# Patient Record
Sex: FEMALE | Race: BLACK OR AFRICAN AMERICAN | Employment: PART TIME | URBAN - METROPOLITAN AREA
[De-identification: names, ages, dates, MRNs, and addresses within clinical notes are randomized per-mention and may not be internally consistent; named-entity substitution may affect disease eponyms.]

---

## 2021-06-03 ENCOUNTER — LABORATORY ENCOUNTER (OUTPATIENT)
Dept: LAB | Facility: REFERENCE LAB | Age: 56
End: 2021-06-03
Attending: FAMILY MEDICINE
Payer: COMMERCIAL

## 2021-06-03 ENCOUNTER — OFFICE VISIT (OUTPATIENT)
Dept: FAMILY MEDICINE CLINIC | Facility: CLINIC | Age: 56
End: 2021-06-03
Payer: COMMERCIAL

## 2021-06-03 VITALS
WEIGHT: 169 LBS | SYSTOLIC BLOOD PRESSURE: 130 MMHG | DIASTOLIC BLOOD PRESSURE: 82 MMHG | OXYGEN SATURATION: 98 % | HEART RATE: 80 BPM | BODY MASS INDEX: 29.95 KG/M2 | HEIGHT: 63 IN

## 2021-06-03 DIAGNOSIS — Z13.220 SCREENING FOR HYPERLIPIDEMIA: ICD-10-CM

## 2021-06-03 DIAGNOSIS — Z87.898 HISTORY OF SEIZURE: ICD-10-CM

## 2021-06-03 DIAGNOSIS — Z00.00 PHYSICAL EXAM, ANNUAL: Primary | ICD-10-CM

## 2021-06-03 DIAGNOSIS — Z13.1 DIABETES MELLITUS SCREENING: ICD-10-CM

## 2021-06-03 DIAGNOSIS — R42 DIZZINESS: ICD-10-CM

## 2021-06-03 DIAGNOSIS — K21.9 GASTROESOPHAGEAL REFLUX DISEASE, UNSPECIFIED WHETHER ESOPHAGITIS PRESENT: ICD-10-CM

## 2021-06-03 DIAGNOSIS — Z12.31 ENCOUNTER FOR SCREENING MAMMOGRAM FOR MALIGNANT NEOPLASM OF BREAST: ICD-10-CM

## 2021-06-03 PROCEDURE — 3075F SYST BP GE 130 - 139MM HG: CPT | Performed by: FAMILY MEDICINE

## 2021-06-03 PROCEDURE — 3079F DIAST BP 80-89 MM HG: CPT | Performed by: FAMILY MEDICINE

## 2021-06-03 PROCEDURE — 99386 PREV VISIT NEW AGE 40-64: CPT | Performed by: FAMILY MEDICINE

## 2021-06-03 PROCEDURE — 99214 OFFICE O/P EST MOD 30 MIN: CPT | Performed by: FAMILY MEDICINE

## 2021-06-03 PROCEDURE — 3008F BODY MASS INDEX DOCD: CPT | Performed by: FAMILY MEDICINE

## 2021-06-03 RX ORDER — OMEPRAZOLE 20 MG/1
20 TABLET, DELAYED RELEASE ORAL DAILY
Qty: 90 TABLET | Refills: 1 | Status: SHIPPED | OUTPATIENT
Start: 2021-06-03

## 2021-06-03 NOTE — PROGRESS NOTES
HPI:   Krys De La Cruz is a 61year old female who presents for a complete physical exam, ED f/u, and refill of omeprazole,     ED f/u: Went to [facility] ED on 5/30 for acute dizziness and nausea.  Had labs and EKG that were normal. Was discharged home with z ANESTH, SECTION      x2   • HYSTERECTOMY  2015    Partial  AUB.       Family History   Problem Relation Age of Onset   • Hypertension Mother    • Heart Disease Mother    • Other (COPD) Maternal Grandfather    • Other (Stomach Cancer) Sister send results from prior EGD. -History of seizures: Last seizure was >20 years ago. Plan to re-establish with neurology if recurs. -Due for mammo.  -Baseline labs ordered.   -Pt to send results from prior colonoscopy. -Vaccines UTD.      Discussed with

## 2021-06-08 ENCOUNTER — TELEPHONE (OUTPATIENT)
Dept: FAMILY MEDICINE CLINIC | Facility: CLINIC | Age: 56
End: 2021-06-08

## 2021-06-08 NOTE — TELEPHONE ENCOUNTER
Francis Ray, DO   6/7/2021 12:12 PM CDT Back to Top      Called and LVMTCB. Francis Ray,    6/3/2021  5:35 PM CDT       Called and LVM. 15

## 2021-06-18 ENCOUNTER — HOSPITAL ENCOUNTER (OUTPATIENT)
Dept: MAMMOGRAPHY | Age: 56
Discharge: HOME | End: 2021-06-18
Attending: FAMILY MEDICINE
Payer: COMMERCIAL

## 2021-06-18 DIAGNOSIS — Z12.31 ENCOUNTER FOR SCREENING MAMMOGRAM FOR MALIGNANT NEOPLASM OF BREAST: ICD-10-CM

## 2021-06-18 PROCEDURE — 77063 BREAST TOMOSYNTHESIS BI: CPT | Performed by: FAMILY MEDICINE

## 2021-06-18 PROCEDURE — 77067 SCR MAMMO BI INCL CAD: CPT | Performed by: FAMILY MEDICINE

## 2021-06-23 ENCOUNTER — MED REC SCAN ONLY (OUTPATIENT)
Dept: FAMILY MEDICINE CLINIC | Facility: CLINIC | Age: 56
End: 2021-06-23

## 2021-07-20 ENCOUNTER — MED REC SCAN ONLY (OUTPATIENT)
Dept: FAMILY MEDICINE CLINIC | Facility: CLINIC | Age: 56
End: 2021-07-20

## 2021-07-28 ENCOUNTER — HOSPITAL ENCOUNTER (OUTPATIENT)
Dept: MAMMOGRAPHY | Facility: HOSPITAL | Age: 56
Discharge: HOME | End: 2021-07-28
Attending: FAMILY MEDICINE
Payer: COMMERCIAL

## 2021-07-28 ENCOUNTER — HOSPITAL ENCOUNTER (OUTPATIENT)
Dept: ULTRASOUND IMAGING | Facility: HOSPITAL | Age: 56
Discharge: HOME | End: 2021-07-28
Attending: FAMILY MEDICINE
Payer: COMMERCIAL

## 2021-07-28 DIAGNOSIS — R92.8 ABNORMAL MAMMOGRAM: ICD-10-CM

## 2021-07-28 PROCEDURE — 76642 ULTRASOUND BREAST LIMITED: CPT | Performed by: FAMILY MEDICINE

## 2021-07-28 PROCEDURE — 77065 DX MAMMO INCL CAD UNI: CPT | Performed by: FAMILY MEDICINE

## 2021-07-28 PROCEDURE — 77061 BREAST TOMOSYNTHESIS UNI: CPT | Performed by: FAMILY MEDICINE

## 2021-08-12 ENCOUNTER — TELEPHONE (OUTPATIENT)
Dept: FAMILY MEDICINE CLINIC | Facility: CLINIC | Age: 56
End: 2021-08-12

## 2021-08-12 ENCOUNTER — HOSPITAL ENCOUNTER (OUTPATIENT)
Age: 56
Discharge: HOME | End: 2021-08-12
Payer: COMMERCIAL

## 2021-08-12 VITALS
SYSTOLIC BLOOD PRESSURE: 139 MMHG | OXYGEN SATURATION: 100 % | DIASTOLIC BLOOD PRESSURE: 91 MMHG | HEART RATE: 76 BPM | HEIGHT: 63 IN | RESPIRATION RATE: 18 BRPM | TEMPERATURE: 97 F | BODY MASS INDEX: 29.95 KG/M2 | WEIGHT: 169 LBS

## 2021-08-12 DIAGNOSIS — R42 VERTIGO: Primary | ICD-10-CM

## 2021-08-12 PROCEDURE — 93000 ELECTROCARDIOGRAM COMPLETE: CPT | Performed by: NURSE PRACTITIONER

## 2021-08-12 PROCEDURE — 96360 HYDRATION IV INFUSION INIT: CPT | Performed by: NURSE PRACTITIONER

## 2021-08-12 PROCEDURE — 99204 OFFICE O/P NEW MOD 45 MIN: CPT | Performed by: NURSE PRACTITIONER

## 2021-08-12 RX ORDER — SODIUM CHLORIDE 9 MG/ML
1000 INJECTION, SOLUTION INTRAVENOUS ONCE
Status: COMPLETED | OUTPATIENT
Start: 2021-08-12 | End: 2021-08-12

## 2021-08-12 RX ORDER — DIAZEPAM 5 MG/1
5 TABLET ORAL 3 TIMES DAILY PRN
Qty: 20 TABLET | Refills: 0 | Status: SHIPPED | OUTPATIENT
Start: 2021-08-12 | End: 2021-08-19

## 2021-08-12 RX ORDER — DIAZEPAM 5 MG/1
5 TABLET ORAL ONCE
Status: COMPLETED | OUTPATIENT
Start: 2021-08-12 | End: 2021-08-12

## 2021-08-12 NOTE — TELEPHONE ENCOUNTER
Patient calling symptoms are : dizziness for about 3 hours. I took ondansedron for dizzness but it has not helped.

## 2021-08-12 NOTE — ED INITIAL ASSESSMENT (HPI)
Per pt having dizziness feeling unsteady and nausea since this morning, worse with movement or opening eyes. Per pt took zofran pta. States was recently seen at [facility] for the same symptoms.

## 2021-08-12 NOTE — TELEPHONE ENCOUNTER
Called pt and c/o dizziness for 3 hours, \"when standing-up room is spinning\". C/o little blurry vision. Pt took zofran therefore denies N/V. Denies headache,       Advised pt to go to IC. Pt verbalized understanding and agrees with POC.           Sallye Nageotte

## 2021-08-12 NOTE — ED PROVIDER NOTES
Patient Seen in: [facility]      History   Patient presents with:  Dizziness    Stated Complaint: Dizzy    HPI/Subjective:   HPI    This is a 21-year-old female with a history of seizures who presents with a chief complaint of dizziness.   Pa °C)   Temp src 08/12/21 1233 Temporal   SpO2 08/12/21 1233 100 %   O2 Device 08/12/21 1233 None (Room air)       Current:BP (!) 139/91   Pulse 76   Temp 96.7 °F (35.9 °C) (Temporal)   Resp 18   Ht 160 cm (5' 3\")   Wt 76.7 kg   SpO2 100%   BMI 29.94 kg/m² Course   Labs Reviewed - No data to display  EKG    Rate, intervals and axes as noted on EKG Report. Rate: 75  Rhythm: Sinus Rhythm  Reading: Sinus Rhythm           IV fluids, EKG, Valium and re-evaluate. EKG reviewed and was unremarkable.   IV fluids c Normal, Disp-20 tablet, R-0

## 2021-12-02 ENCOUNTER — HOSPITAL ENCOUNTER (OUTPATIENT)
Age: 56
Discharge: ANOTHER HOSPITAL | End: 2021-12-02
Payer: COMMERCIAL

## 2021-12-02 ENCOUNTER — APPOINTMENT (OUTPATIENT)
Dept: CT IMAGING | Facility: HOSPITAL | Age: 56
End: 2021-12-02
Attending: EMERGENCY MEDICINE
Payer: COMMERCIAL

## 2021-12-02 ENCOUNTER — HOSPITAL ENCOUNTER (EMERGENCY)
Facility: HOSPITAL | Age: 56
Discharge: HOME | End: 2021-12-02
Attending: EMERGENCY MEDICINE
Payer: COMMERCIAL

## 2021-12-02 VITALS
DIASTOLIC BLOOD PRESSURE: 77 MMHG | SYSTOLIC BLOOD PRESSURE: 114 MMHG | RESPIRATION RATE: 16 BRPM | OXYGEN SATURATION: 99 % | TEMPERATURE: 97 F | HEART RATE: 73 BPM

## 2021-12-02 VITALS
OXYGEN SATURATION: 99 % | HEART RATE: 77 BPM | DIASTOLIC BLOOD PRESSURE: 83 MMHG | SYSTOLIC BLOOD PRESSURE: 121 MMHG | TEMPERATURE: 98 F | RESPIRATION RATE: 19 BRPM

## 2021-12-02 DIAGNOSIS — R10.9 ABDOMINAL PAIN, ACUTE: Primary | ICD-10-CM

## 2021-12-02 PROCEDURE — 74177 CT ABD & PELVIS W/CONTRAST: CPT | Performed by: EMERGENCY MEDICINE

## 2021-12-02 PROCEDURE — 85025 COMPLETE CBC W/AUTO DIFF WBC: CPT | Performed by: EMERGENCY MEDICINE

## 2021-12-02 PROCEDURE — 99215 OFFICE O/P EST HI 40 MIN: CPT | Performed by: NURSE PRACTITIONER

## 2021-12-02 PROCEDURE — 80048 BASIC METABOLIC PNL TOTAL CA: CPT | Performed by: EMERGENCY MEDICINE

## 2021-12-02 PROCEDURE — 99284 EMERGENCY DEPT VISIT MOD MDM: CPT

## 2021-12-02 PROCEDURE — 36415 COLL VENOUS BLD VENIPUNCTURE: CPT

## 2021-12-02 PROCEDURE — 81003 URINALYSIS AUTO W/O SCOPE: CPT

## 2021-12-02 PROCEDURE — 81003 URINALYSIS AUTO W/O SCOPE: CPT | Performed by: EMERGENCY MEDICINE

## 2021-12-02 NOTE — ED INITIAL ASSESSMENT (HPI)
Libra is here for evaluation of left lower abdominal pain since this morning. History of diverticulitis.

## 2021-12-02 NOTE — ED PROVIDER NOTES
Patient Seen in: [facility]mediate [facility]      History   Patient presents with:  Nausea/Vomiting/Diarrhea    Stated Complaint: abdominal pain    Subjective:   68-year-old female presents to immediate care today with nausea vomiting diarrhea and left lower note reviewed. Constitutional:       General: She is in acute distress (2/2 to pain). Appearance: Normal appearance. She is well-developed and normal weight. She is not ill-appearing or toxic-appearing.    HENT:      Head: Normocephalic and atraumati Patient declines citing that she wants to go to [facility].                         Disposition and Plan     Clinical Impression:  Abdominal pain, acute  (primary encounter diagnosis)     Disposition:   Ic to ed  12/2/2021  2:19 pm    [employer]

## 2021-12-02 NOTE — ED INITIAL ASSESSMENT (HPI)
Pt states woke up this morning nauseated and needing to vomit and have a bm. Pt states took a zofran and has not had vomiting abut has had diarrhea. Pt states lower abd discomfort.

## 2021-12-02 NOTE — ED PROVIDER NOTES
Patient Seen in: [facility] AND [facility] Emergency Department      History   Patient presents with:  Abdomen/Flank Pain    Stated Complaint:     Subjective:   HPI    59yoF with hx of diverticulitis here with c/o abdominal pain x1 day.  No aggravating or relievi Eyes:      Extraocular Movements: Extraocular movements intact. Cardiovascular:      Rate and Rhythm: Regular rhythm. Pulmonary:      Effort: Pulmonary effort is normal.   Abdominal:      General: There is no distension.    Musculoskeletal:         Ge x10(6)uL    HGB 14.4 12.0 - 16.0 g/dL    HCT 45.2 35.0 - 48.0 %    MCV 93.0 80.0 - 100.0 fL    MCH 29.6 26.0 - 34.0 pg    MCHC 31.9 31.0 - 37.0 g/dL    RDW-SD 46.3 35.1 - 46.3 fL    RDW 13.5 11.0 - 15.0 %    .0 150.0 - 450.0 10(3)uL    Neutrophil Ab pain  - I personally reviewed and interpreted all the ED vitals  - afebrile, hemodynamically stable  - I ordered and personally reviewed the labs and imaging and found CT without acute finding, no leukocytosis, no anemia, electrolytes reassuring, no bacter

## 2021-12-03 NOTE — ED QUICK NOTES
Patient discharged home in no acute distress. A&Ox4, skin p/w/d, denies cp/sob.  Ambulating with steady gait and verbalized understanding of d/c instructions

## 2023-03-29 ENCOUNTER — LAB ENCOUNTER (OUTPATIENT)
Dept: LAB | Facility: REFERENCE LAB | Age: 58
End: 2023-03-29
Attending: FAMILY MEDICINE
Payer: COMMERCIAL

## 2023-03-29 ENCOUNTER — OFFICE VISIT (OUTPATIENT)
Facility: CLINIC | Age: 58
End: 2023-03-29

## 2023-03-29 VITALS
DIASTOLIC BLOOD PRESSURE: 84 MMHG | HEIGHT: 63 IN | WEIGHT: 178 LBS | OXYGEN SATURATION: 97 % | HEART RATE: 88 BPM | BODY MASS INDEX: 31.54 KG/M2 | SYSTOLIC BLOOD PRESSURE: 130 MMHG

## 2023-03-29 DIAGNOSIS — M54.9 UPPER BACK PAIN ON RIGHT SIDE: ICD-10-CM

## 2023-03-29 DIAGNOSIS — Z00.00 PHYSICAL EXAM, ANNUAL: Primary | ICD-10-CM

## 2023-03-29 DIAGNOSIS — Z12.4 CERVICAL CANCER SCREENING: ICD-10-CM

## 2023-03-29 DIAGNOSIS — E78.5 DYSLIPIDEMIA: ICD-10-CM

## 2023-03-29 DIAGNOSIS — R73.09 ELEVATED HEMOGLOBIN A1C: ICD-10-CM

## 2023-03-29 DIAGNOSIS — R92.8 ABNORMAL MAMMOGRAM: ICD-10-CM

## 2023-03-29 DIAGNOSIS — K21.9 GASTROESOPHAGEAL REFLUX DISEASE, UNSPECIFIED WHETHER ESOPHAGITIS PRESENT: ICD-10-CM

## 2023-03-29 LAB
ALBUMIN SERPL-MCNC: 3.7 G/DL (ref 3.4–5)
ALBUMIN/GLOB SERPL: 1.1 {RATIO} (ref 1–2)
ALP LIVER SERPL-CCNC: 71 U/L
ALT SERPL-CCNC: 28 U/L
ANION GAP SERPL CALC-SCNC: 7 MMOL/L (ref 0–18)
AST SERPL-CCNC: 17 U/L (ref 15–37)
BASOPHILS # BLD AUTO: 0.02 X10(3) UL (ref 0–0.2)
BASOPHILS NFR BLD AUTO: 0.5 %
BILIRUB SERPL-MCNC: 0.7 MG/DL (ref 0.1–2)
BUN BLD-MCNC: 9 MG/DL (ref 7–18)
BUN/CREAT SERPL: 12 (ref 10–20)
CALCIUM BLD-MCNC: 9.2 MG/DL (ref 8.5–10.1)
CHLORIDE SERPL-SCNC: 112 MMOL/L (ref 98–112)
CHOLEST SERPL-MCNC: 198 MG/DL (ref ?–200)
CO2 SERPL-SCNC: 25 MMOL/L (ref 21–32)
CREAT BLD-MCNC: 0.75 MG/DL
DEPRECATED RDW RBC AUTO: 46.5 FL (ref 35.1–46.3)
EOSINOPHIL # BLD AUTO: 0.06 X10(3) UL (ref 0–0.7)
EOSINOPHIL NFR BLD AUTO: 1.6 %
ERYTHROCYTE [DISTWIDTH] IN BLOOD BY AUTOMATED COUNT: 13.7 % (ref 11–15)
EST. AVERAGE GLUCOSE BLD GHB EST-MCNC: 120 MG/DL (ref 68–126)
FASTING PATIENT LIPID ANSWER: NO
FASTING STATUS PATIENT QL REPORTED: NO
GFR SERPLBLD BASED ON 1.73 SQ M-ARVRAT: 92 ML/MIN/1.73M2 (ref 60–?)
GLOBULIN PLAS-MCNC: 3.4 G/DL (ref 2.8–4.4)
GLUCOSE BLD-MCNC: 109 MG/DL (ref 70–99)
HBA1C MFR BLD: 5.8 % (ref ?–5.7)
HCT VFR BLD AUTO: 44.7 %
HCV AB SERPL QL IA: NONREACTIVE
HDLC SERPL-MCNC: 62 MG/DL (ref 40–59)
HGB BLD-MCNC: 14.7 G/DL
IMM GRANULOCYTES # BLD AUTO: 0.01 X10(3) UL (ref 0–1)
IMM GRANULOCYTES NFR BLD: 0.3 %
LDLC SERPL CALC-MCNC: 120 MG/DL (ref ?–100)
LYMPHOCYTES # BLD AUTO: 1.22 X10(3) UL (ref 1–4)
LYMPHOCYTES NFR BLD AUTO: 32.8 %
MCH RBC QN AUTO: 30.3 PG (ref 26–34)
MCHC RBC AUTO-ENTMCNC: 32.9 G/DL (ref 31–37)
MCV RBC AUTO: 92.2 FL
MONOCYTES # BLD AUTO: 0.24 X10(3) UL (ref 0.1–1)
MONOCYTES NFR BLD AUTO: 6.5 %
NEUTROPHILS # BLD AUTO: 2.17 X10 (3) UL (ref 1.5–7.7)
NEUTROPHILS # BLD AUTO: 2.17 X10(3) UL (ref 1.5–7.7)
NEUTROPHILS NFR BLD AUTO: 58.3 %
NONHDLC SERPL-MCNC: 136 MG/DL (ref ?–130)
OSMOLALITY SERPL CALC.SUM OF ELEC: 297 MOSM/KG (ref 275–295)
PLATELET # BLD AUTO: 280 10(3)UL (ref 150–450)
POTASSIUM SERPL-SCNC: 3.7 MMOL/L (ref 3.5–5.1)
PROT SERPL-MCNC: 7.1 G/DL (ref 6.4–8.2)
RBC # BLD AUTO: 4.85 X10(6)UL
SODIUM SERPL-SCNC: 144 MMOL/L (ref 136–145)
TRIGL SERPL-MCNC: 91 MG/DL (ref 30–149)
VLDLC SERPL CALC-MCNC: 16 MG/DL (ref 0–30)
WBC # BLD AUTO: 3.7 X10(3) UL (ref 4–11)

## 2023-03-29 PROCEDURE — 85025 COMPLETE CBC W/AUTO DIFF WBC: CPT | Performed by: FAMILY MEDICINE

## 2023-03-29 PROCEDURE — 80061 LIPID PANEL: CPT | Performed by: FAMILY MEDICINE

## 2023-03-29 PROCEDURE — 80053 COMPREHEN METABOLIC PANEL: CPT | Performed by: FAMILY MEDICINE

## 2023-03-29 PROCEDURE — 83036 HEMOGLOBIN GLYCOSYLATED A1C: CPT | Performed by: FAMILY MEDICINE

## 2023-03-29 PROCEDURE — 87624 HPV HI-RISK TYP POOLED RSLT: CPT | Performed by: FAMILY MEDICINE

## 2023-03-29 PROCEDURE — 36415 COLL VENOUS BLD VENIPUNCTURE: CPT | Performed by: FAMILY MEDICINE

## 2023-03-29 PROCEDURE — 86803 HEPATITIS C AB TEST: CPT | Performed by: FAMILY MEDICINE

## 2023-03-29 PROCEDURE — 99396 PREV VISIT EST AGE 40-64: CPT | Performed by: FAMILY MEDICINE

## 2023-03-29 RX ORDER — OMEPRAZOLE 20 MG/1
20 TABLET, DELAYED RELEASE ORAL DAILY
Qty: 90 TABLET | Refills: 1 | Status: SHIPPED | OUTPATIENT
Start: 2023-03-29

## 2023-03-30 DIAGNOSIS — D72.9 ABNORMAL WBC COUNT: Primary | ICD-10-CM

## 2023-03-30 LAB — HPV I/H RISK 1 DNA SPEC QL NAA+PROBE: NEGATIVE

## 2024-04-02 ENCOUNTER — OFFICE VISIT (OUTPATIENT)
Facility: CLINIC | Age: 59
End: 2024-04-02
Payer: COMMERCIAL

## 2024-04-02 ENCOUNTER — LAB ENCOUNTER (OUTPATIENT)
Dept: LAB | Facility: REFERENCE LAB | Age: 59
End: 2024-04-02
Attending: FAMILY MEDICINE
Payer: COMMERCIAL

## 2024-04-02 VITALS
HEART RATE: 77 BPM | BODY MASS INDEX: 30.48 KG/M2 | HEIGHT: 63 IN | SYSTOLIC BLOOD PRESSURE: 134 MMHG | DIASTOLIC BLOOD PRESSURE: 80 MMHG | WEIGHT: 172 LBS | OXYGEN SATURATION: 98 %

## 2024-04-02 DIAGNOSIS — Z00.00 PHYSICAL EXAM, ANNUAL: Primary | ICD-10-CM

## 2024-04-02 DIAGNOSIS — K21.9 GASTROESOPHAGEAL REFLUX DISEASE, UNSPECIFIED WHETHER ESOPHAGITIS PRESENT: ICD-10-CM

## 2024-04-02 DIAGNOSIS — M25.512 CHRONIC LEFT SHOULDER PAIN: ICD-10-CM

## 2024-04-02 DIAGNOSIS — R73.09 ELEVATED HEMOGLOBIN A1C: ICD-10-CM

## 2024-04-02 DIAGNOSIS — G89.29 CHRONIC LEFT SHOULDER PAIN: ICD-10-CM

## 2024-04-02 DIAGNOSIS — E78.5 DYSLIPIDEMIA: ICD-10-CM

## 2024-04-02 DIAGNOSIS — R92.8 ABNORMAL MAMMOGRAM: ICD-10-CM

## 2024-04-02 LAB
ALBUMIN SERPL-MCNC: 4.4 G/DL (ref 3.2–4.8)
ALBUMIN/GLOB SERPL: 1.5 {RATIO} (ref 1–2)
ALP LIVER SERPL-CCNC: 67 U/L
ALT SERPL-CCNC: 22 U/L
ANION GAP SERPL CALC-SCNC: 2 MMOL/L (ref 0–18)
AST SERPL-CCNC: 20 U/L (ref ?–34)
BASOPHILS # BLD AUTO: 0.01 X10(3) UL (ref 0–0.2)
BASOPHILS NFR BLD AUTO: 0.3 %
BILIRUB SERPL-MCNC: 1 MG/DL (ref 0.2–1.1)
BUN BLD-MCNC: 10 MG/DL (ref 9–23)
BUN/CREAT SERPL: 13.3 (ref 10–20)
CALCIUM BLD-MCNC: 9.5 MG/DL (ref 8.7–10.4)
CHLORIDE SERPL-SCNC: 114 MMOL/L (ref 98–112)
CHOLEST SERPL-MCNC: 216 MG/DL (ref ?–200)
CO2 SERPL-SCNC: 24 MMOL/L (ref 21–32)
CREAT BLD-MCNC: 0.75 MG/DL
DEPRECATED RDW RBC AUTO: 45.1 FL (ref 35.1–46.3)
EGFRCR SERPLBLD CKD-EPI 2021: 91 ML/MIN/1.73M2 (ref 60–?)
EOSINOPHIL # BLD AUTO: 0.11 X10(3) UL (ref 0–0.7)
EOSINOPHIL NFR BLD AUTO: 2.9 %
ERYTHROCYTE [DISTWIDTH] IN BLOOD BY AUTOMATED COUNT: 13.8 % (ref 11–15)
EST. AVERAGE GLUCOSE BLD GHB EST-MCNC: 120 MG/DL (ref 68–126)
FASTING PATIENT LIPID ANSWER: YES
FASTING STATUS PATIENT QL REPORTED: YES
GLOBULIN PLAS-MCNC: 2.9 G/DL (ref 2.8–4.4)
GLUCOSE BLD-MCNC: 93 MG/DL (ref 70–99)
HBA1C MFR BLD: 5.8 % (ref ?–5.7)
HCT VFR BLD AUTO: 43.5 %
HDLC SERPL-MCNC: 52 MG/DL (ref 40–59)
HGB BLD-MCNC: 14.3 G/DL
IMM GRANULOCYTES # BLD AUTO: 0.01 X10(3) UL (ref 0–1)
IMM GRANULOCYTES NFR BLD: 0.3 %
LDLC SERPL CALC-MCNC: 150 MG/DL (ref ?–100)
LYMPHOCYTES # BLD AUTO: 1.42 X10(3) UL (ref 1–4)
LYMPHOCYTES NFR BLD AUTO: 36.9 %
MCH RBC QN AUTO: 29.4 PG (ref 26–34)
MCHC RBC AUTO-ENTMCNC: 32.9 G/DL (ref 31–37)
MCV RBC AUTO: 89.5 FL
MONOCYTES # BLD AUTO: 0.23 X10(3) UL (ref 0.1–1)
MONOCYTES NFR BLD AUTO: 6 %
NEUTROPHILS # BLD AUTO: 2.07 X10 (3) UL (ref 1.5–7.7)
NEUTROPHILS # BLD AUTO: 2.07 X10(3) UL (ref 1.5–7.7)
NEUTROPHILS NFR BLD AUTO: 53.6 %
NONHDLC SERPL-MCNC: 164 MG/DL (ref ?–130)
OSMOLALITY SERPL CALC.SUM OF ELEC: 289 MOSM/KG (ref 275–295)
PLATELET # BLD AUTO: 287 10(3)UL (ref 150–450)
POTASSIUM SERPL-SCNC: 3.8 MMOL/L (ref 3.5–5.1)
PROT SERPL-MCNC: 7.3 G/DL (ref 5.7–8.2)
RBC # BLD AUTO: 4.86 X10(6)UL
SODIUM SERPL-SCNC: 140 MMOL/L (ref 136–145)
TRIGL SERPL-MCNC: 80 MG/DL (ref 30–149)
VLDLC SERPL CALC-MCNC: 15 MG/DL (ref 0–30)
WBC # BLD AUTO: 3.9 X10(3) UL (ref 4–11)

## 2024-04-02 PROCEDURE — 99396 PREV VISIT EST AGE 40-64: CPT | Performed by: FAMILY MEDICINE

## 2024-04-02 PROCEDURE — 80061 LIPID PANEL: CPT | Performed by: FAMILY MEDICINE

## 2024-04-02 PROCEDURE — 85025 COMPLETE CBC W/AUTO DIFF WBC: CPT | Performed by: FAMILY MEDICINE

## 2024-04-02 PROCEDURE — 80053 COMPREHEN METABOLIC PANEL: CPT | Performed by: FAMILY MEDICINE

## 2024-04-02 PROCEDURE — 36415 COLL VENOUS BLD VENIPUNCTURE: CPT | Performed by: FAMILY MEDICINE

## 2024-04-02 PROCEDURE — 83036 HEMOGLOBIN GLYCOSYLATED A1C: CPT | Performed by: FAMILY MEDICINE

## 2024-04-02 RX ORDER — OMEPRAZOLE 20 MG/1
20 TABLET, DELAYED RELEASE ORAL DAILY
Qty: 90 TABLET | Refills: 1 | Status: SHIPPED | OUTPATIENT
Start: 2024-04-02

## 2024-04-02 NOTE — PROGRESS NOTES
HPI:   Libra Underwood is a 59 year old female who presents for a complete physical exam.     Has chronic left shoulder pain. Does mail processing so repetitive work. Bothersome for a couple months.   No regular exercise.  Not eating healthy. Eats out a lot.     Takes omeprazole prn and sparingly.     Last pap: negative cotesting in 2023. Hx of partial hysterectomy.   Last mammogram: abnormal in 2021. Never completed repeat diag mammo & ultrasound.      Previous colonoscopy: 2015 at [facility]. Rec'd repeat in 10 years.   Family hx of breast, ovarian, cervical or colon CA: See FH  Diet and exercise: See above  Immunizations:  Tdap: 2017, Shingles: s/p Zostavax in 2019, Covid: to consider booster    REVIEW OF SYSTEMS:   GENERAL: feels well otherwise   SKIN: denies any unusual skin lesions  EYES: no vision problems  BREAST: negative  LUNGS: denies shortness of breath  CARDIOVASCULAR: denies chest pain  GI: denies abdominal pain,  No constipation or diarrhea, no hematochezia or melena  : negative  NEURO: denies headaches  PSYCHE: denies depression or anxiety          Current Outpatient Medications   Medication Sig Dispense Refill    Omeprazole Magnesium 20 MG Oral Tab EC Take 1 tablet (20 mg total) by mouth daily. 90 tablet 1     Allergies   Allergen Reactions    Phenobarbital OTHER (SEE COMMENTS)    Phenytoin OTHER (SEE COMMENTS)      Past Medical History:   Diagnosis Date    History of seizures       Past Surgical History:   Procedure Laterality Date    ANESTH, SECTION      x2    HYSTERECTOMY      Partial  AUB.      Family History   Problem Relation Age of Onset    Hypertension Mother     Heart Disease Mother     Other (COPD) Maternal Grandfather     Other (Stomach Cancer) Sister       Social History:   Social History     Socioeconomic History    Marital status: Single   Tobacco Use    Smoking status: Never     Passive exposure: Never    Smokeless tobacco: Never   Vaping Use    Vaping  Use: Never used   Substance and Sexual Activity    Alcohol use: Yes     Comment: social    Drug use: Never    Sexual activity: Yes            EXAM:     Wt Readings from Last 6 Encounters:   04/02/24 172 lb (78 kg)   03/29/23 178 lb (80.7 kg)   08/12/21 169 lb (76.7 kg)   06/03/21 169 lb (76.7 kg)     Body mass index is 30.47 kg/m².   /80   Pulse 77   Ht 5' 3\" (1.6 m)   Wt 172 lb (78 kg)   SpO2 98%   BMI 30.47 kg/m²       GENERAL: well developed, well nourished, in no apparent distress   SKIN: no rashes, no suspicious lesions  HEENT: atraumatic, normocephalic, throat clear; normal dentition. MMM. TM & EAC normal b/l.   EYES: PERRLA, EOMI, conjunctiva are clear  NECK: supple, no adenopathy or thyroid masses   LUNGS: clear to auscultation  CARDIO: RRR without murmur  GI: good bowel sounds, no masses, HSM or tenderness  EXTREMITIES: no edema  LEFT SHOULDER: Normal exam.   NEURO: Alert & oriented, motor & sensation grossly intact and symmetric    Cholesterol, Total (mg/dL)   Date Value   03/29/2023 198   06/03/2021 219 (H)     HDL Cholesterol (mg/dL)   Date Value   03/29/2023 62 (H)   06/03/2021 54     LDL Cholesterol (mg/dL)   Date Value   03/29/2023 120 (H)   06/03/2021 153 (H)      ASSESSMENT AND PLAN:   Libra Underwood is a 59 year old female who presents for a complete physical exam.  Encounter Diagnoses   Name Primary?    Physical exam, annual Yes    Abnormal mammogram     Dyslipidemia     Elevated hemoglobin A1c     Gastroesophageal reflux disease, unspecified whether esophagitis present     Chronic left shoulder pain      Orders Placed This Encounter   Procedures    Hemoglobin A1C    CBC With Differential With Platelet    Comp Metabolic Panel (14)    Lipid Panel       -Chronic left shoulder pain: Likely rotator cuff tendinitis. Normal exam. HEP provided. Plan for formal PT if worsens.   -GERD: Continue omeprazole prn. Avoid triggers.   -Annual labs ordered.   -Overdue for mammogram.   -To consider  Shingrix.     Discussed with patient the following:  -Breast cancer screening/mammograms and clinical breast exams  -Cervical cancer screening/pap smears  -Colon cancer screening/colonoscopy  -Adequate calcium and Vitamin D intake to prevent osteoporosis  -Bone density screening for osteopenia/osteoporosis  -Healthy diet including adequate intake of vegetables and fruits, appropriate portion sizes, minimizing highly concentrated carbohydrate foods  -Exercising 30 minutes a day most days of the week   -Diabetes screening    -Cholesterol screening   -Recommendation for yearly influenza vaccine  -Need for Tdap once as an adult and Td booster every 10 years  -Need for Zoster vaccine for patients >= 50    All questions were answered during the visit and the patient verbalizes understanding. She will return in one year for next WWE or sooner as needed.    Meds & Refills for this Visit:  Requested Prescriptions     Signed Prescriptions Disp Refills    Omeprazole Magnesium 20 MG Oral Tab EC 90 tablet 1     Sig: Take 1 tablet (20 mg total) by mouth daily.       Imaging & Consults:  ANNABEL GRECO 2D+3D DIAGNOSTIC ANNABEL CABA (BBA=01076/87104)    Catrachito Ragland DO  4/2/2024  9:32 AM

## 2024-05-30 ENCOUNTER — TELEPHONE (OUTPATIENT)
Facility: CLINIC | Age: 59
End: 2024-05-30

## 2024-05-30 NOTE — TELEPHONE ENCOUNTER
1st Attempt: 5/30/2024    Left Voice Mail To Call Back     Outreach patient to help schedule Mammogram screening.     Last visit: 7/28/22 for Mammogram screening

## 2024-06-04 ENCOUNTER — NURSE ONLY (OUTPATIENT)
Facility: CLINIC | Age: 59
End: 2024-06-04
Payer: COMMERCIAL

## 2024-06-04 PROCEDURE — 90750 HZV VACC RECOMBINANT IM: CPT | Performed by: FAMILY MEDICINE

## 2024-06-04 PROCEDURE — 90471 IMMUNIZATION ADMIN: CPT | Performed by: FAMILY MEDICINE

## 2024-06-07 NOTE — TELEPHONE ENCOUNTER
Pt called stated that the Omeprazole is not at the [facility]'s Pharm    Please advise        [facility] #[id number] - [facility] [address], [phone], [phone]

## 2024-06-07 NOTE — TELEPHONE ENCOUNTER
Rx pended  Called pharmacy: medication is not covered by insurance as it is available over the counter   Pharmacy tech ran Omeprazole Capsules, insurance WILL cover  Called patient to inform            Sent patient my chart with receipt confirmation    Outpatient Medication Detail     Disp Refills Start End    Omeprazole Magnesium 20 MG Oral Tab EC 90 tablet 1 4/2/2024 --    Sig - Route: Take 1 tablet (20 mg total) by mouth daily. - Oral    Sent to pharmacy as: Omeprazole Magnesium 20 MG Oral Tablet Delayed Release (PRILOSEC OTC)    E-Prescribing Status: Receipt confirmed by pharmacy (4/2/2024  9:53 AM CDT)      Pharmacy    [facility] #[id number] - [address], [phone], [phone]

## 2024-06-07 NOTE — TELEPHONE ENCOUNTER
Patient is calling requesting the prescription to be resent to the pharmacy per the patient the pharmacy stated that they did not received the prescription. Please assist.      Omeprazole Magnesium 20 MG Oral Tab [facility] #[id number] - [facility] [address], [phone], [phone]

## 2024-06-08 RX ORDER — OMEPRAZOLE 20 MG/1
20 CAPSULE, DELAYED RELEASE ORAL
Qty: 90 CAPSULE | Refills: 1 | Status: SHIPPED | OUTPATIENT
Start: 2024-06-08

## 2024-08-26 ENCOUNTER — TELEPHONE (OUTPATIENT)
Facility: CLINIC | Age: 59
End: 2024-08-26

## 2024-10-25 ENCOUNTER — TELEPHONE (OUTPATIENT)
Dept: OBGYN CLINIC | Facility: CLINIC | Age: 59
End: 2024-10-25

## 2024-10-25 NOTE — TELEPHONE ENCOUNTER
Incoming call from patient requesting a ER follow .  Patient stated she needs to be seen in order to return to work .    Please advice .

## 2024-10-28 ENCOUNTER — OFFICE VISIT (OUTPATIENT)
Facility: CLINIC | Age: 59
End: 2024-10-28
Payer: COMMERCIAL

## 2024-10-28 VITALS
BODY MASS INDEX: 30.3 KG/M2 | WEIGHT: 171 LBS | HEART RATE: 90 BPM | OXYGEN SATURATION: 96 % | HEIGHT: 63 IN | DIASTOLIC BLOOD PRESSURE: 84 MMHG | SYSTOLIC BLOOD PRESSURE: 128 MMHG

## 2024-10-28 DIAGNOSIS — S63.502D SPRAIN OF LEFT WRIST, SUBSEQUENT ENCOUNTER: Primary | ICD-10-CM

## 2024-10-28 PROCEDURE — 99214 OFFICE O/P EST MOD 30 MIN: CPT | Performed by: FAMILY MEDICINE

## 2024-10-28 RX ORDER — ACETAMINOPHEN 325 MG/1
325 TABLET ORAL EVERY 6 HOURS PRN
COMMUNITY

## 2024-10-28 NOTE — PROGRESS NOTES
HPI:    Patient ID: Libra Underwood is a 59 year old female who presents for ED f/u.    HPI  Went to ED on 10/20/24 for left wrist pain that started at work. XR negative for acute findings.     Needs RTW clearance. Has paperwork with her.  She missed 3 days of work but was told to RTW. So, she's been working.   Works as .   Works Th-M. From 7a-3:30p.   Job does require heavy lifting.   Right-handed.  Using topicals and tylenol.   Pain is improving overall.   Does feel pain with certain movements & lifting at work.   Has ACE-wrap.    Past Medical History:    History of seizures        Current Outpatient Medications   Medication Sig Dispense Refill    acetaminophen 325 MG Oral Tab Take 1 tablet (325 mg total) by mouth every 6 (six) hours as needed for Pain.      omeprazole 20 MG Oral Capsule Delayed Release Take 1 capsule (20 mg total) by mouth every morning before breakfast. (Patient not taking: Reported on 10/28/2024) 90 capsule 1        Allergies[1]    Review of Systems   Constitutional: Negative.    Respiratory: Negative.     Cardiovascular: Negative.    Gastrointestinal: Negative.    Musculoskeletal:  Positive for arthralgias.   Neurological: Negative.    All other systems reviewed and are negative.           /84   Pulse 90   Ht 5' 3\" (1.6 m)   Wt 171 lb (77.6 kg)   SpO2 96%   BMI 30.29 kg/m²     PHYSICAL EXAM:   Physical Exam  Constitutional:       General: She is not in acute distress.     Appearance: Normal appearance. She is not ill-appearing, toxic-appearing or diaphoretic.   Pulmonary:      Effort: Pulmonary effort is normal.   Musculoskeletal:      Left wrist: Tenderness (mild ttp at dorsolateral aspect) present. No swelling, deformity, effusion, bony tenderness, snuff box tenderness or crepitus. Normal range of motion. Normal pulse.      Left hand: No swelling, deformity, tenderness or bony tenderness. Normal range of motion. Normal strength. Normal sensation. Normal capillary  refill. Normal pulse.   Neurological:      General: No focal deficit present.      Mental Status: She is alert.   Psychiatric:         Mood and Affect: Mood normal.         Behavior: Behavior normal.         Thought Content: Thought content normal.         Judgment: Judgment normal.             ASSESSMENT/PLAN:     Encounter Diagnosis   Name Primary?    Sprain of left wrist, subsequent encounter Yes       1. Sprain of left wrist, subsequent encounter       -Reviewed ED note & results.   -Improving.   -Continue supportive care. Recommend ACE wrap or wrist splint during work to help mobilize wrist over the next couple weeks.  -Okay to continue working, although recommend restricting weight lifting to no more than 15 lbs. Paperwork completed. Copy provided to patient, and copy mailed to PO Box listed on form per request.   -Plan to RTW w/o restrictions on 11/21/24 should pains resolve.     Meds This Visit:  Requested Prescriptions      No prescriptions requested or ordered in this encounter       Imaging & Referrals:  None    A total of 32 minutes were spent with patient and reviewing medical records pertaining to this visit.        Catrachito Ragland, DO  ID#[id number]         [1]   Allergies  Allergen Reactions    Phenobarbital OTHER (SEE COMMENTS)    Phenytoin OTHER (SEE COMMENTS)

## 2024-11-05 ENCOUNTER — TELEPHONE (OUTPATIENT)
Facility: CLINIC | Age: 59
End: 2024-11-05

## 2024-11-05 NOTE — TELEPHONE ENCOUNTER
Spoke with patient, Date of Birth verified  She stated she spoke with her manager at work and her worker's comp letter needs to be modify.   She works at the [employer] as a .   She provided the form to PCP last week on her last visit.   She wants to work at least 6 hours a day, with weight restriction of less than 10 lbs, this will be effective from 10/28 until 11/20/24.  Site staff pls assist on the form.  pls advise, thanks in advance.

## 2024-11-06 NOTE — TELEPHONE ENCOUNTER
Patient calling back. She is requesting form to be faxed to her HR department.     Attn: Oliva Boston  Fax: [phone]   Patient needs random BMP

## 2024-11-08 NOTE — TELEPHONE ENCOUNTER
Patient calling, verified name and date of birth.  Patient  calling to say that her employer has not received the return to work form  Mentioned in the 11/6/24 encounters below.    She can't return to work until her employer receives this form, and she hasn't received the copy by mail yet.  .  [facility] Clinical Staff Please assist with faxing form today to:   Attn: Oliva Boston  Fax: [phone]

## 2024-11-08 NOTE — TELEPHONE ENCOUNTER
Incoming call from patient following up on the request to fax the form to HR. Patient would like a confirmation that it was done    Please assist

## 2025-03-12 ENCOUNTER — LAB ENCOUNTER (OUTPATIENT)
Dept: LAB | Age: 60
End: 2025-03-12
Attending: FAMILY MEDICINE
Payer: COMMERCIAL

## 2025-03-12 ENCOUNTER — OFFICE VISIT (OUTPATIENT)
Facility: CLINIC | Age: 60
End: 2025-03-12
Payer: COMMERCIAL

## 2025-03-12 ENCOUNTER — TELEPHONE (OUTPATIENT)
Facility: CLINIC | Age: 60
End: 2025-03-12

## 2025-03-12 VITALS
WEIGHT: 175 LBS | SYSTOLIC BLOOD PRESSURE: 124 MMHG | HEART RATE: 78 BPM | OXYGEN SATURATION: 98 % | HEIGHT: 63 IN | DIASTOLIC BLOOD PRESSURE: 76 MMHG | BODY MASS INDEX: 31.01 KG/M2

## 2025-03-12 DIAGNOSIS — E78.5 DYSLIPIDEMIA: ICD-10-CM

## 2025-03-12 DIAGNOSIS — R92.8 ABNORMAL MAMMOGRAM: ICD-10-CM

## 2025-03-12 DIAGNOSIS — Z12.11 COLON CANCER SCREENING: ICD-10-CM

## 2025-03-12 DIAGNOSIS — Z00.00 PHYSICAL EXAM, ANNUAL: Primary | ICD-10-CM

## 2025-03-12 DIAGNOSIS — M79.605 LEFT LEG PAIN: ICD-10-CM

## 2025-03-12 DIAGNOSIS — R73.03 PREDIABETES: ICD-10-CM

## 2025-03-12 LAB
ALBUMIN SERPL-MCNC: 4.4 G/DL (ref 3.2–4.8)
ALBUMIN/GLOB SERPL: 1.8 {RATIO} (ref 1–2)
ALP LIVER SERPL-CCNC: 63 U/L
ALT SERPL-CCNC: 17 U/L
ANION GAP SERPL CALC-SCNC: 7 MMOL/L (ref 0–18)
AST SERPL-CCNC: 19 U/L (ref ?–34)
BASOPHILS # BLD AUTO: 0.02 X10(3) UL (ref 0–0.2)
BASOPHILS NFR BLD AUTO: 0.5 %
BILIRUB SERPL-MCNC: 0.8 MG/DL (ref 0.2–1.1)
BUN BLD-MCNC: 11 MG/DL (ref 9–23)
BUN/CREAT SERPL: 13.6 (ref 10–20)
CALCIUM BLD-MCNC: 8.8 MG/DL (ref 8.7–10.4)
CHLORIDE SERPL-SCNC: 114 MMOL/L (ref 98–112)
CHOLEST SERPL-MCNC: 210 MG/DL (ref ?–200)
CO2 SERPL-SCNC: 24 MMOL/L (ref 21–32)
CREAT BLD-MCNC: 0.81 MG/DL
DEPRECATED RDW RBC AUTO: 46.7 FL (ref 35.1–46.3)
EGFRCR SERPLBLD CKD-EPI 2021: 83 ML/MIN/1.73M2 (ref 60–?)
EOSINOPHIL # BLD AUTO: 0.11 X10(3) UL (ref 0–0.7)
EOSINOPHIL NFR BLD AUTO: 2.9 %
ERYTHROCYTE [DISTWIDTH] IN BLOOD BY AUTOMATED COUNT: 13.6 % (ref 11–15)
EST. AVERAGE GLUCOSE BLD GHB EST-MCNC: 123 MG/DL (ref 68–126)
FASTING PATIENT LIPID ANSWER: YES
FASTING STATUS PATIENT QL REPORTED: YES
GLOBULIN PLAS-MCNC: 2.4 G/DL (ref 2–3.5)
GLUCOSE BLD-MCNC: 109 MG/DL (ref 70–99)
HBA1C MFR BLD: 5.9 % (ref ?–5.7)
HCT VFR BLD AUTO: 43.7 %
HDLC SERPL-MCNC: 51 MG/DL (ref 40–59)
HGB BLD-MCNC: 14.6 G/DL
IMM GRANULOCYTES # BLD AUTO: 0 X10(3) UL (ref 0–1)
IMM GRANULOCYTES NFR BLD: 0 %
LDLC SERPL CALC-MCNC: 144 MG/DL (ref ?–100)
LYMPHOCYTES # BLD AUTO: 1.36 X10(3) UL (ref 1–4)
LYMPHOCYTES NFR BLD AUTO: 36.4 %
MCH RBC QN AUTO: 30.9 PG (ref 26–34)
MCHC RBC AUTO-ENTMCNC: 33.4 G/DL (ref 31–37)
MCV RBC AUTO: 92.6 FL
MONOCYTES # BLD AUTO: 0.3 X10(3) UL (ref 0.1–1)
MONOCYTES NFR BLD AUTO: 8 %
NEUTROPHILS # BLD AUTO: 1.95 X10 (3) UL (ref 1.5–7.7)
NEUTROPHILS # BLD AUTO: 1.95 X10(3) UL (ref 1.5–7.7)
NEUTROPHILS NFR BLD AUTO: 52.2 %
NONHDLC SERPL-MCNC: 159 MG/DL (ref ?–130)
OSMOLALITY SERPL CALC.SUM OF ELEC: 300 MOSM/KG (ref 275–295)
PLATELET # BLD AUTO: 274 10(3)UL (ref 150–450)
POTASSIUM SERPL-SCNC: 4 MMOL/L (ref 3.5–5.1)
PROT SERPL-MCNC: 6.8 G/DL (ref 5.7–8.2)
RBC # BLD AUTO: 4.72 X10(6)UL
SODIUM SERPL-SCNC: 145 MMOL/L (ref 136–145)
TRIGL SERPL-MCNC: 81 MG/DL (ref 30–149)
VLDLC SERPL CALC-MCNC: 15 MG/DL (ref 0–30)
WBC # BLD AUTO: 3.7 X10(3) UL (ref 4–11)

## 2025-03-12 PROCEDURE — 85025 COMPLETE CBC W/AUTO DIFF WBC: CPT | Performed by: FAMILY MEDICINE

## 2025-03-12 PROCEDURE — 90471 IMMUNIZATION ADMIN: CPT | Performed by: FAMILY MEDICINE

## 2025-03-12 PROCEDURE — 36415 COLL VENOUS BLD VENIPUNCTURE: CPT | Performed by: FAMILY MEDICINE

## 2025-03-12 PROCEDURE — 90472 IMMUNIZATION ADMIN EACH ADD: CPT | Performed by: FAMILY MEDICINE

## 2025-03-12 PROCEDURE — 80061 LIPID PANEL: CPT | Performed by: FAMILY MEDICINE

## 2025-03-12 PROCEDURE — 99396 PREV VISIT EST AGE 40-64: CPT | Performed by: FAMILY MEDICINE

## 2025-03-12 PROCEDURE — 83036 HEMOGLOBIN GLYCOSYLATED A1C: CPT | Performed by: FAMILY MEDICINE

## 2025-03-12 PROCEDURE — 90750 HZV VACC RECOMBINANT IM: CPT | Performed by: FAMILY MEDICINE

## 2025-03-12 PROCEDURE — 90677 PCV20 VACCINE IM: CPT | Performed by: FAMILY MEDICINE

## 2025-03-12 PROCEDURE — 99213 OFFICE O/P EST LOW 20 MIN: CPT | Performed by: FAMILY MEDICINE

## 2025-03-12 PROCEDURE — 80053 COMPREHEN METABOLIC PANEL: CPT | Performed by: FAMILY MEDICINE

## 2025-03-12 NOTE — TELEPHONE ENCOUNTER
1st attempt to schedule telephone colon screening.     Left message to call back.     Plan to await call back or follow up.     [facility] Staff     Please schedule TCS appointment upon return call.     Thank you!

## 2025-03-12 NOTE — PROGRESS NOTES
HPI:   Libra Underwood is a 60 year old female who presents for a complete physical exam.     Has pain in left leg near knee joint for past 6 months. Worse in cold weather and with standing. Stands all day at work. No other associated symptoms. No initial event or trauma.     Last pap: negative cotesting in 2023. Hx of partial hysterectomy.   Last mammogram: abnormal in 2021. Never completed repeat diag mammo & ultrasound.      Previous colonoscopy: 2015 at [facility]. Rec'd repeat in 10 years.   Family hx of breast, ovarian, cervical or colon CA: See FH  Diet and exercise: Diet is okay. Does home exercises.   Immunizations:  Tdap: 2017, Shingles: s/p Shingrix x1 dose, Covid: to consider booster, PCV20: Never    REVIEW OF SYSTEMS:   GENERAL: feels well otherwise   SKIN: denies any unusual skin lesions  EYES: no vision problems  BREAST: negative  LUNGS: denies shortness of breath  CARDIOVASCULAR: denies chest pain  GI: denies abdominal pain,  No constipation or diarrhea, no hematochezia or melena  : negative  NEURO: denies headaches  PSYCHE: denies depression or anxiety          Current Outpatient Medications   Medication Sig Dispense Refill    acetaminophen 325 MG Oral Tab Take 1 tablet (325 mg total) by mouth every 6 (six) hours as needed for Pain.      omeprazole 20 MG Oral Capsule Delayed Release Take 1 capsule (20 mg total) by mouth every morning before breakfast. 90 capsule 1     Allergies[1]   Past Medical History:    History of seizures      Past Surgical History:   Procedure Laterality Date    Anesth, section      x2    Hysterectomy  2015    Partial  AUB.      Family History   Problem Relation Age of Onset    Hypertension Mother     Heart Disease Mother     Other (COPD) Maternal Grandfather     Other (Stomach Cancer) Sister       Social History:   Social History     Socioeconomic History    Marital status: Single   Tobacco Use    Smoking status: Never     Passive exposure: Never     Smokeless tobacco: Never   Vaping Use    Vaping status: Never Used   Substance and Sexual Activity    Alcohol use: Yes     Comment: social    Drug use: Never    Sexual activity: Yes            EXAM:     Wt Readings from Last 6 Encounters:   03/12/25 175 lb (79.4 kg)   10/28/24 171 lb (77.6 kg)   04/02/24 172 lb (78 kg)   03/29/23 178 lb (80.7 kg)   08/12/21 169 lb (76.7 kg)   06/03/21 169 lb (76.7 kg)     Body mass index is 31 kg/m².   /76   Pulse 78   Ht 5' 3\" (1.6 m)   Wt 175 lb (79.4 kg)   SpO2 98%   BMI 31.00 kg/m²     GENERAL: well developed, well nourished, in no apparent distress   SKIN: no rashes, no suspicious lesions  HEENT: atraumatic, normocephalic, throat clear; normal dentition. MMM. TM & EAC normal b/l.   EYES: PERRLA, EOMI, conjunctiva are clear  NECK: supple, no adenopathy or thyroid masses  LUNGS: clear to auscultation  CARDIO: RRR without murmur  GI: good bowel sounds, no masses, HSM or tenderness  EXTREMITIES: no edema  LEFT LEG: Normal exam. Normal knee joint. No ttp or swelling.  NEURO: Alert & oriented, motor & sensation grossly intact and symmetric    Cholesterol, Total (mg/dL)   Date Value   04/02/2024 216 (H)   03/29/2023 198   06/03/2021 219 (H)     HDL Cholesterol (mg/dL)   Date Value   04/02/2024 52   03/29/2023 62 (H)   06/03/2021 54     LDL Cholesterol (mg/dL)   Date Value   04/02/2024 150 (H)   03/29/2023 120 (H)   06/03/2021 153 (H)      ASSESSMENT AND PLAN:   Libra Underwood is a 60 year old female who presents for a complete physical exam.  Encounter Diagnoses   Name Primary?    Physical exam, annual Yes    Left leg pain     Dyslipidemia     Prediabetes     Abnormal mammogram     Colon cancer screening      Orders Placed This Encounter   Procedures    Hemoglobin A1C    CBC With Differential With Platelet    Comp Metabolic Panel (14)    Lipid Panel    Zoster Vac (Shingrix) in office vaccine- Non-Medicare or Medicare with ABN    PCV20 (Prevnar 20)       -Left leg/knee  pain: Seems to be more related to knee joint. Ddx includes OA and patellofemoral pain syndrome. HEP provided. Plan for formal PT if persists.   -Annual labs ordered.  -Due for colonoscopy in 04/2025.   -Overdue for diagnostic mammogram.   -Shingrix #2 and PCV20 provided today.     Discussed with patient the following:  -Breast cancer screening/mammograms and clinical breast exams  -Cervical cancer screening/pap smears  -Colon cancer screening/colonoscopy  -Adequate calcium and Vitamin D intake to prevent osteoporosis  -Bone density screening for osteopenia/osteoporosis  -Healthy diet including adequate intake of vegetables and fruits, appropriate portion sizes, minimizing highly concentrated carbohydrate foods  -Exercising 30 minutes a day most days of the week   -Diabetes screening    -Cholesterol screening   -Recommendation for yearly influenza vaccine  -Need for Tdap once as an adult and Td booster every 10 years  -Need for Zoster vaccine for patients >= 50    All questions were answered during the visit and the patient verbalizes understanding. She will return in one year for next WWE or sooner as needed.    Meds & Refills for this Visit:  Requested Prescriptions      No prescriptions requested or ordered in this encounter       Imaging & Consults:  ZOSTER VACC RECOMBINANT IM NJX  PCV20 VACCINE FOR INTRAMUSCULAR USE  [facility] ANGUS 2D+3D DIAGNOSTIC [facility]  BILAT (CPT=77066/12588)  OP REFERRAL TO [facility] TELEPHONE COLON SCREEN    Catrachito Ragland DO  3/12/2025  9:27 AM         [1]   Allergies  Allergen Reactions    Phenobarbital OTHER (SEE COMMENTS)    Phenytoin OTHER (SEE COMMENTS)

## 2025-03-12 NOTE — TELEPHONE ENCOUNTER
Spoke with patient and verified date of birth.     Reviewed indication to schedule telephone colon screening appointment.     Confirmed date, time and details for phone screening. Verified best contact number, 2-part GI questionnaire, and no active GI symptoms.     Verbalized understanding and appreciative for call.     Future Appointments   Date Time Provider Department Center   3/19/2025 11:00 AM GI COLON SCREENING [id number] None   4/2/2025  9:20 AM [facility] RM1 [facility]

## 2025-03-19 ENCOUNTER — NURSE ONLY (OUTPATIENT)
Facility: CLINIC | Age: 60
End: 2025-03-19

## 2025-03-19 NOTE — PROGRESS NOTES
GI Staff:  [facility] Colon Screening Orders    Please schedule: Colonoscopy 23303 with MAC OR IV (if appropriate)    Please send split dose Golytely bowel prep     Diagnosis: Colon Screening Z12.11 /      Medication adjustments:Hold Vitamins for 7 days prior to the procedure.        >>>Please inform patient if new medications are started after scheduling procedure they need to call clinic to notify us.

## 2025-03-19 NOTE — PROGRESS NOTES
Called patient for scheduled telephone colon screening/positive FIT result.   Medications, pharmacy, and allergies verified with the patient.   Please advise on colonoscopy and bowel prep orders.     Age 45-64 y/o (Y/N):   66-73 y/o ? Route to GI provider per rotation schedule   › GI MD preference:   › Insurance:  [employer]  › Last PCP Visit: 3/12/2025  IF NO PCP within [facility] ? GI in-person consultation   › Last CBC drawn: 3/12/2025  › Date of positive FIT test: N/A  › H/W/BMI: 5'3\"/175 lb/31.01    Special comments/notes: Last Colonoscopy 2015 at [facility]  Telephone Colon Screening Questionnaire Yes No   Are you currently experiencing any new/abnormal GI symptoms? [] [x]   If yes, explain:     Rectal bleeding? [] [x]   Black stool? [] [x]   Dysphagia or food \"feeling stuck\" when eating? [] [x]   Intractable vomiting? [] [x]   Unexplained weight loss? [] [x]   First colonoscopy? [] [x]   Family history of colon cancer? [] [x]   Any issues with anesthesia? [] [x]   If yes, explain:      Have you had a stroke, heart attack or stent placement in the last 12 months?  [] [x]   If yes ? GI in-person consultation      Personal history of resp. Issues/oxygen use/JAZMIN/COPD [] [x]   If yes, CPAP/BiPAP?     History of devices (pacemaker/defibrillator) [] [x]   History of cardiac/CVA issues/(MI/stroke) (see above) [] [x]     Medications Yes  No   Anticoagulants  Anticoagulant (Except Aspirin) ? route to RN pool to request adjustments from prescribing provider  [] [x]   Diabetic Meds  PO ? hold DAY PRIOR and DAY OF procedure  Insulin ? route to RN pool to request adjustments from prescribing provider  [] [x]   Weight loss meds (Phentermine/Vyvanse/Saxsenda/etc): [] [x]   Iron/herbal/multivitamin supplement (RX/OTC): [x] []   Usage of marijuana, CBD &/or vape products: [] [x]

## 2025-04-02 ENCOUNTER — HOSPITAL ENCOUNTER (OUTPATIENT)
Dept: MAMMOGRAPHY | Facility: HOSPITAL | Age: 60
Discharge: HOME | End: 2025-04-02
Attending: FAMILY MEDICINE
Payer: COMMERCIAL

## 2025-04-02 ENCOUNTER — HOSPITAL ENCOUNTER (OUTPATIENT)
Dept: ULTRASOUND IMAGING | Facility: HOSPITAL | Age: 60
Discharge: HOME | End: 2025-04-02
Attending: FAMILY MEDICINE
Payer: COMMERCIAL

## 2025-04-02 DIAGNOSIS — R92.8 ABNORMAL MAMMOGRAM: ICD-10-CM

## 2025-04-02 PROCEDURE — 77062 BREAST TOMOSYNTHESIS BI: CPT | Performed by: FAMILY MEDICINE

## 2025-04-02 PROCEDURE — 77066 DX MAMMO INCL CAD BI: CPT | Performed by: FAMILY MEDICINE

## 2025-04-02 PROCEDURE — 76642 ULTRASOUND BREAST LIMITED: CPT | Performed by: FAMILY MEDICINE

## (undated) NOTE — LETTER
Date & Time: 12/2/2021, 6:42 PM  Patient: Zoila Underwood  Encounter Provider(s): Gustabo Calloway MD       To Whom It May Concern:    Libra Underwood was seen and treated in our department on 12/2/2021.  She should not return to work until Saturday